# Patient Record
Sex: MALE | Race: BLACK OR AFRICAN AMERICAN
[De-identification: names, ages, dates, MRNs, and addresses within clinical notes are randomized per-mention and may not be internally consistent; named-entity substitution may affect disease eponyms.]

---

## 2017-01-16 ENCOUNTER — HOSPITAL ENCOUNTER (EMERGENCY)
Dept: HOSPITAL 72 - EMR | Age: 42
Discharge: HOME | End: 2017-01-16
Payer: COMMERCIAL

## 2017-01-16 VITALS — SYSTOLIC BLOOD PRESSURE: 158 MMHG | DIASTOLIC BLOOD PRESSURE: 88 MMHG

## 2017-01-16 VITALS — HEIGHT: 69 IN | BODY MASS INDEX: 41.47 KG/M2 | WEIGHT: 280 LBS

## 2017-01-16 VITALS — SYSTOLIC BLOOD PRESSURE: 153 MMHG | DIASTOLIC BLOOD PRESSURE: 98 MMHG

## 2017-01-16 DIAGNOSIS — R07.89: Primary | ICD-10-CM

## 2017-01-16 DIAGNOSIS — M25.511: ICD-10-CM

## 2017-01-16 LAB
ALBUMIN/GLOB SERPL: 1.3 {RATIO} (ref 1–2.7)
ALT SERPL-CCNC: 16 U/L (ref 3–41)
ANION GAP SERPL CALC-SCNC: 16 MMOL/L (ref 5–15)
APPEARANCE UR: CLEAR
APTT BLD: 29 SEC (ref 23–33)
AST SERPL-CCNC: 14 U/L (ref 5–40)
BASOPHILS NFR BLD AUTO: 1.4 % (ref 0–2)
CALCIUM SERPL-MCNC: 9.6 MG/DL (ref 8.6–10.2)
CHLORIDE SERPL-SCNC: 94 MEQ/L (ref 98–107)
CO2 SERPL-SCNC: 26 MEQ/L (ref 20–30)
CREAT SERPL-MCNC: 1 MG/DL (ref 0.7–1.2)
EOSINOPHIL NFR BLD AUTO: 1.6 % (ref 0–3)
ERYTHROCYTE [DISTWIDTH] IN BLOOD BY AUTOMATED COUNT: 10.7 % (ref 11.6–14.8)
GFR SERPLBLD BASED ON 1.73 SQ M-ARVRAT: > 60 ML/MIN (ref 60–?)
GLOBULIN SER-MCNC: 3.3 G/DL
HEMOLYSIS: 9
INR PPP: 1 (ref 0.9–1.1)
KETONES UR QL STRIP: (no result)
LEUKOCYTE ESTERASE UR QL STRIP: NEGATIVE
LYMPHOCYTES NFR BLD AUTO: 21 % (ref 20–45)
MCH RBC QN AUTO: 31 PG (ref 27–31)
MCHC RBC AUTO-ENTMCNC: 33.4 G/DL (ref 32–36)
MCV RBC AUTO: 93 FL (ref 80–99)
MONOCYTES NFR BLD AUTO: 7.2 % (ref 1–10)
NEUTROPHILS NFR BLD AUTO: 68.8 % (ref 45–75)
NITRITE UR QL STRIP: NEGATIVE
PH UR STRIP: 6 [PH] (ref 4.5–8)
PLATELET # BLD: 276 K/UL (ref 150–450)
PMV BLD AUTO: 8.5 FL (ref 6.5–10.1)
POTASSIUM SERPL-SCNC: 4 MEQ/L (ref 3.4–4.9)
PROT SERPL-MCNC: 7.6 G/DL (ref 6.6–8.7)
PROT UR QL STRIP: NEGATIVE
PROTHROMBIN TIME: 10 SEC (ref 9.3–11.5)
RBC # BLD AUTO: 5.7 M/UL (ref 4.7–6.1)
SODIUM SERPL-SCNC: 136 MEQ/L (ref 135–145)
SP GR UR STRIP: 1.02 (ref 1–1.03)
TROPONIN I SERPL-MCNC: < 0.3 NG/ML (ref ?–0.3)
UROBILINOGEN UR-MCNC: 1 MG/DL (ref 0–1)
WBC # BLD AUTO: 10.9 K/UL (ref 4.8–10.8)

## 2017-01-16 PROCEDURE — 85730 THROMBOPLASTIN TIME PARTIAL: CPT

## 2017-01-16 PROCEDURE — 85610 PROTHROMBIN TIME: CPT

## 2017-01-16 PROCEDURE — 71010: CPT

## 2017-01-16 PROCEDURE — 93005 ELECTROCARDIOGRAM TRACING: CPT

## 2017-01-16 PROCEDURE — 82550 ASSAY OF CK (CPK): CPT

## 2017-01-16 PROCEDURE — 84484 ASSAY OF TROPONIN QUANT: CPT

## 2017-01-16 PROCEDURE — 96374 THER/PROPH/DIAG INJ IV PUSH: CPT

## 2017-01-16 PROCEDURE — 83880 ASSAY OF NATRIURETIC PEPTIDE: CPT

## 2017-01-16 PROCEDURE — 85025 COMPLETE CBC W/AUTO DIFF WBC: CPT

## 2017-01-16 PROCEDURE — 81003 URINALYSIS AUTO W/O SCOPE: CPT

## 2017-01-16 PROCEDURE — 80300: CPT

## 2017-01-16 PROCEDURE — 99284 EMERGENCY DEPT VISIT MOD MDM: CPT

## 2017-01-16 PROCEDURE — 36415 COLL VENOUS BLD VENIPUNCTURE: CPT

## 2017-01-16 PROCEDURE — 73030 X-RAY EXAM OF SHOULDER: CPT

## 2017-01-16 PROCEDURE — 80053 COMPREHEN METABOLIC PANEL: CPT

## 2017-01-16 NOTE — EMERGENCY ROOM REPORT
History of Present Illness


General


Chief Complaint:  Chest Pain





Present Illness


[facility]


Chest pain started this AM.  Non-exertional, sharp, 6/10, not radiate.  No 

diaphoresis, NV.  Had before.  





Also has R shoulder pain. Worsened with movement.  8/10, aching/sharp.  Not 

radiate.  Tender to palpation.  No trauma.  





Had eval at hosp - told needed further studies (1 month ago).  Not follow up.





RF + pre-diabetic.





No fever, cough, dyspnea, rashes, HA, change bowels.


Allergies:  


Coded Allergies:  


     No Known Allergies (Unverified , 5/28/14)





Patient History


Past Medical History:  see triage record


Social History:  Denies: smoking


Social History Narrative


culinary student





Review of Systems


All Other Systems:  negative except mentioned in HPI





Physical Exam





Vital Signs








  Date Time  Temp Pulse Resp B/P Pulse Ox O2 Delivery O2 Flow Rate FiO2


 


1/16/17 00:20 99.0 82 18 172/101 96 Room Air  








Sp02 EP Interpretation:  reviewed, normal


General Appearance:  well appearing, no apparent distress, GCS 15


Head:  normocephalic


Eyes:  bilateral eye PERRL, bilateral eye normal inspection


ENT:  moist mucus membranes


Neck:  supple


Respiratory:  lungs clear, normal breath sounds


Cardiovascular #1:  regular rate, rhythm


Cardiovascular #2:  2+ radial (R)


Gastrointestinal:  normal inspection, normal bowel sounds, non tender, no mass, 

non-distended


Musculoskeletal:  back normal, gait/station normal, normal range of motion, 

other - tenderness to palpation R shoulder with some pain with active ROM and 

abduction


Neurologic:  alert, oriented x3


Psychiatric:  mood/affect normal


Skin:  normal inspection, warm/dry, other - no warmth over shoulder





Medical Decision Making


Diagnostic Impression:  


 Primary Impression:  


 Chest pain


 Qualified Codes:  R07.9 - Chest pain, unspecified


 Additional Impression:  


 Right shoulder pain


 Qualified Codes:  M25.511 - Pain in right shoulder


ER Course


Patient presents with atypical chest pain.  DDx: ACS, AMI, GERD, pleurisy 

amongst others.  Needs eval for ACS.  Also has R shoulder pain.  This needs 

eval with x-ray.  Will treat with analgesics.





Labs exclude myocardial damage.  EKG normal.  CXR normal.  





Improved with treatment.  Doubt cardiac etiology.





Patient stable for outpatient observation and treatment.





Labs








Test


  1/16/17


00:45


 


White Blood Count


  10.9 K/UL


(4.8-10.8)


 


Red Blood Count


  5.70 M/UL


(4.70-6.10)


 


Hemoglobin


  17.7 G/DL


(14.2-18.0)


 


Hematocrit


  53.0 %


(42.0-52.0)


 


Mean Corpuscular Volume 93 FL (80-99) 


 


Mean Corpuscular Hemoglobin


  31.0 PG


(27.0-31.0)


 


Mean Corpuscular Hemoglobin


Concent 33.4 G/DL


(32.0-36.0)


 


Red Cell Distribution Width


  10.7 %


(11.6-14.8)


 


Platelet Count


  276 K/UL


(150-450)


 


Mean Platelet Volume


  8.5 FL


(6.5-10.1)


 


Neutrophils (%) (Auto)


  68.8 %


(45.0-75.0)


 


Lymphocytes (%) (Auto)


  21.0 %


(20.0-45.0)


 


Monocytes (%) (Auto)


  7.2 %


(1.0-10.0)


 


Eosinophils (%) (Auto)


  1.6 %


(0.0-3.0)


 


Basophils (%) (Auto)


  1.4 %


(0.0-2.0)


 


Prothrombin Time


  10.0 SEC


(9.30-11.50)


 


Prothromb Time International


Ratio 1.0 (0.9-1.1) 


 


 


Activated Partial


Thromboplast Time 29 SEC (23-33) 


 


 


Urine Color Yellow 


 


Urine Appearance Clear 


 


Urine pH 6 (4.5-8.0) 


 


Urine Specific Gravity


  1.025


(1.005-1.035)


 


Urine Protein


  Negative


(NEGATIVE)


 


Urine Glucose (UA) 2+ (NEGATIVE) 


 


Urine Ketones 1+ (NEGATIVE) 


 


Urine Occult Blood


  Negative


(NEGATIVE)


 


Urine Nitrite


  Negative


(NEGATIVE)


 


Urine Bilirubin


  Negative


(NEGATIVE)


 


Urine Urobilinogen


  1 MG/DL


(0.0-1.0)


 


Urine Leukocyte Esterase


  Negative


(NEGATIVE)


 


Sodium Level


  136 mEQ/L


(135-145)


 


Potassium Level


  4.0 mEQ/L


(3.4-4.9)


 


Chloride Level


  94 mEQ/L


()


 


Carbon Dioxide Level


  26 mEQ/L


(20-30)


 


Anion Gap 16 (5-15) 


 


Blood Urea Nitrogen


  13 mg/dL


(7-23)


 


Creatinine


  1.0 mg/dL


(0.7-1.2)


 


Estimat Glomerular Filtration


Rate > 60 mL/min


(>60)


 


Glucose Level


  199 mg/dL


()


 


Calcium Level


  9.6 mg/dL


(8.6-10.2)


 


Total Bilirubin


  0.4 mg/dL


(0.0-1.2)


 


Aspartate Amino Transf


(AST/SGOT) 14 U/L (5-40) 


 


 


Alanine Aminotransferase


(ALT/SGPT) 16 U/L (3-41) 


 


 


Alkaline Phosphatase


  69 U/L


()


 


Total Creatine Kinase


  99 U/L


()


 


Troponin I


  < 0.30 ng/mL


(<=0.30)


 


Pro-B-Type Natriuretic Peptide


  47 pg/mL


(0-125)


 


Total Protein


  7.6 g/dL


(6.6-8.7)


 


Albumin


  4.3 g/dL


(3.5-5.2)


 


Globulin 3.3 g/dL 


 


Albumin/Globulin Ratio 1.3 (1.0-2.7) 


 


Urine Opiates Screen


  Negative


(NEGATIVE)


 


Urine Barbiturates Screen


  Negative


(NEGATIVE)


 


Phencyclidine (PCP) Screen


  Negative


(NEGATIVE)


 


Urine Amphetamines Screen


  Negative


(NEGATIVE)


 


Urine Benzodiazepines Screen


  Negative


(NEGATIVE)


 


Urine Cocaine Screen


  Negative


(NEGATIVE)


 


Urine Marijuana (THC) Screen


  Negative


(NEGATIVE)








EKG Diagnostic Results


Rate:  normal


Rhythm:  NSR


ST Segments:  no acute changes





Rhythm Strip Diag. Results


EP Interpretation:  yes


Rhythm:  NSR, no PVC's, no ectopy





Chest X-Ray Diagnostic Results


EP Interpretation:  Yes


Findings:  no consolidation, no effusion, no pneumothorax, no acute 

cardiopulmonary disease


Number of Views:  1





Other X-Ray Diagnostic Results


Other X-Ray Diagnostic Results :  


   X-Ray Ordered:  R shoulder


   EP Interpretation:  Yes


   Findings:  no fractures, no dislocation, no soft tissue swelling


   Number of Views:  3





Last Vital Signs








  Date Time  Temp Pulse Resp B/P Pulse Ox O2 Delivery O2 Flow Rate FiO2


 


1/16/17 03:10  77 18 158/88 94 Room Air  





  77      


 


1/16/17 03:04 98.0       





VS against PE.


Status:  improved


Disposition:  HOME, SELF-CARE


Condition:  Improved


Scripts


Ibuprofen* (MOTRIN*) 600 Mg Tablet


600 MG ORAL Q6H Y for For Pain, #20 TAB


   Prov: Yury Viramontes M.D.         1/16/17 


Tramadol Hcl* (ULTRAM*) 50 Mg Tablet


50 MG ORAL Q6H Y for For Pain, #14 TAB 0 Refills


   Prov: Yury Viramontes M.D.         1/16/17


Referrals:  


PREFERRED [facility],REFERRING (PCP)











Yury Viramontes M.D. Jan 16, 2017 02:24

## 2017-01-16 NOTE — DIAGNOSTIC IMAGING REPORT
Indications: Shoulder pain



Technique: 3 views of the right shoulder



Findings:



Comparison: None



No fracture, dislocation, lytic destruction, periosteal reaction, surrounding soft

tissue swelling, or other acute change is demonstrated. Small spur inferior margin

distal end right clavicle. No deformity, alignment abnormality, additional 

arthritic

change, soft tissue calcification, or other chronic change is demonstrated.



IMPRESSION:



Minimal inferior margin spurring distal end right clavicle. Rotator cuff impingement

not excludable.



Otherwise negative right shoulder series.

## 2017-01-16 NOTE — CARDIOLOGY REPORT
--------------- APPROVED REPORT --------------





EKG Measurement

Heart [id number]

[id number]

[id number]

[id number]

[id number]





Normal sinus rhythm

Normal ECG

## 2017-01-17 NOTE — DIAGNOSTIC IMAGING REPORT
Indication: Chest pain



Technique:  Single portable AP view of the chest.



Findings:



Comparison:  5/28/2014



The bones and extra pulmonary soft tissues, cardiomediastinal silhouette, 

pulmonary

vasculature and parenchyma, and pleural surfaces remain unremarkable.



IMPRESSION:



Negative portable AP chest, unchanged.

## 2017-06-25 NOTE — EMERGENCY ROOM REPORT
History of Present Illness


General


Chief Complaint:  Upper Respiratory Illness


Source:  Patient





Present Illness


HPI


42-year-old male presents emergency department complaining of  painful 

productive cough, fevers, chills, wheezing , nasal congestion and sore throat 

x3 days he denies taking  medications for his symptoms. he does not know if he 

had a flu vaccination this year patient denies past medical history other than 

being prediabetic. Pt denies CP at rest, reports pain only with coughing. pt. 

describes deep or normal inhalation exacerbates coughing, and short breaths do 

not illicit his cough. Denies ill contacts or recent travel. Denies  

Palpitations, LOC, AMS, dizziness, Changes in Vision, Sensation, paresthesias, 

or a sudden severe headache.


Allergies:  


Coded Allergies:  


     No Known Allergies (Unverified , 5/28/14)





Patient History


Past Medical History:  see triage record


Past Surgical History:  none


Pertinent Family History:  none


Reviewed Nursing Documentation:  PMH: Agreed, PSxH: Agreed





Nursing Documentation-PMH


Past Medical History:  No Stated History





Review of Systems


All Other Systems:  negative except mentioned in HPI





Physical Exam





Vital Signs








  Date Time  Temp Pulse Resp B/P Pulse Ox O2 Delivery O2 Flow Rate FiO2


 


6/25/17 14:47 98.4 104 18 147/97 96 Room Air  








Sp02 EP Interpretation:  reviewed, abnormal - mild tachycardia 104 bpm


General Appearance:  no apparent distress, alert, GCS 15, non-toxic


Head:  normocephalic, atraumatic


Eyes:  bilateral eye PERRL, bilateral eye normal inspection


ENT:  hearing grossly normal, normal pharynx, no angioedema, normal voice, TMs 

+ canals normal, uvula midline, moist mucus membranes, pharyngeal erythema, 

other - no tonsillar exudates


Neck:  full range of motion, supple/symm/no masses


Respiratory:  chest non-tender, normal breath sounds, speaking full sentences, 

wheezing


Cardiovascular #1:  regular rate, rhythm, no edema, normal capillary refill


Musculoskeletal:  back normal, gait/station normal, normal range of motion, non-

tender


Neurologic:  alert, oriented x3, responsive, motor strength/tone normal, 

sensory intact, speech normal


Psychiatric:  judgement/insight normal, memory normal, mood/affect normal


Skin:  normal color, no rash, warm/dry, well hydrated


Lymphatic:  no adenopathy





Medical Decision Making


PA Attestation


Dr. Link  is my supervising Physician whom patient management has been 

discussed with.


Diagnostic Impression:  


 Primary Impression:  


 Bronchitis


ER Course


42-year-old male presents emergency department complaining of productive cough, 

fevers, chills, wheezing and difficulty breathing x3 days he denies taking any 

medications for his symptoms he does not know if he had a flu vaccination this 

year patient denies past medical history other than being prediabetic. Denies 

ill contacts or recent travel. Denies CP, Palpitations, LOC, AMS, dizziness, 

Changes in Vision, Sensation, paresthesias, or a sudden severe headache.





Ddx considered but are not limited to URI, pneumonia, PE, strep pharyngitis, 

meningitis, bronchitis





Vital signs: Pt. is afebrile, the remaining VS are WNL: initial triage HR was 

mild tachycardic at 104bpm, however subsequent measurements were of normal rate.


H&PE are most consistent with URI- Bronchitis with scant wheezes auscultated 

bilaterally, no meningeal signs, oropharynx is not involved, no evidence of 

bacterial infection at this time. due to presence of multiples symptoms and no 

CP at rest,  cardiac cause is unlikely, i do not suspect PE at this time - not 

consistent with HPI and pt. has other more likely diagnosis. 





ORDERS: 


-CXR:No consolidation, effusion, pneumothorax or acute cardiopulmonary findings 

per soft read in ED by Dr. Link 





ED INTERVENTIONS: 


-Albuterol Nebulized Treatment. - lung sounds have improved bilaterally after 

nebulized treatment.





d/w pt that he will be treated conservatively for URI/bronchitis, and that I do 

not feel antibiotics would be of benefit at this time. d/w pt. to return to the 

ED promptly if he feels his symptoms are becoming worse, or new symptoms 

develop. - Pt verbalized his understanding, and agreement with this treatment 

plan.  








DISCHARGE: At this time pt. is stable for d/c to home. Will provide printed 

patient care instructions, and any necessary prescriptions. Care plan and 

follow up instructions have been discussed with the patient prior to discharge.





Last Vital Signs








  Date Time  Temp Pulse Resp B/P Pulse Ox O2 Delivery O2 Flow Rate FiO2


 


6/25/17 14:47  104 18   Room Air  


 


6/25/17 14:47 98.4   147/97 96   








Disposition:  HOME, SELF-CARE


Condition:  Stable


Scripts


Acetaminophen* (TYLENOL EXTRA STRENGTH*) 500 Mg Tablet


500 MG ORAL Q6H, #20 TAB 0 Refills


   Prov: Amador,Yessica P.A.         6/25/17 


Albuterol Sulfate* (ALBUTEROL SULFATE MDI*) 8.5 Gm Hfa.aer.ad


2 PUFF INH Q3H, #1 INH 0 Refills


   Prov: Yessica Amador         6/25/17 


Codeine/Promethazine Hcl* (PROMETHAZINE-CODEINE SYRUP*) 118 Ml Syrup


5 ML ORAL Q6H Y for For Cough, #118 ML 0 Refills


   Prov: Yessica Amador         6/25/17


Patient Instructions:  Upper Respiratory Infection, Adult





Additional Instructions:  


Take medications as directed. 


Follow up with PCP in 3-5 days 


Return sooner to ED if new symptoms occur, or current symptoms become worse. 








- Please note that this Emergency Department Report was dictated using [employer] technology software, occasionally this can lead to 

erroneous entry secondary to interpretation by the dictation equipment.











Yessica Amador Jun 25, 2017 15:23

## 2017-06-26 NOTE — DIAGNOSTIC IMAGING REPORT
Indication: COUGH



Technique: One view of the chest



Comparison: 1/16/2017



Findings: Lungs and pleural spaces are clear. Heart size is normal. No 

significant

change



Impression: No acute process

## 2017-11-29 NOTE — EMERGENCY ROOM REPORT
History of Present Illness


General


Chief Complaint:  Chest Pain


Source:  Patient





Present Illness


HPI


42-year-old male no significant past medical history p/w chest pain for 2 days. 

Chest pain started while at rest. Localized to substernal area, no radiation to 

back or other areas, sharp in nature, gradual in onset, last about 10-15 minutes

, multiple episodes. Occurred on rest and on exertion.  Denies SOB. Denies 

palpitations, diaphoresis, n/v. This is the first occurrence of chest pain. 


Denies fever, chills, cough, abd pain. Denies trauma.


Patient states that he has had a stress test in the past but does not know the 

results


Patient has never had a cardiac catheterization. 


Denies smoking, no family history of cardiac disease at a young age.


Allergies:  


Coded Allergies:  


     No Known Allergies (Unverified , 5/28/14)





Patient History


Past Medical History:  see triage record


Past Surgical History:  none


Pertinent Family History:  none


Reviewed Nursing Documentation:  PMH: Agreed, PSxH: Agreed





Nursing Documentation-PMH


Past Medical History:  No Stated History





Review of Systems


All Other Systems:  negative except mentioned in HPI





Physical Exam





Vital Signs








  Date Time  Temp Pulse Resp B/P (MAP) Pulse Ox O2 Delivery O2 Flow Rate FiO2


 


11/29/17 19:52 97.9 84 20 159/114 98 Room Air  








Sp02 EP Interpretation:  reviewed, normal


General Appearance:  normal inspection, well appearing, no apparent distress, 

alert, GCS 15, non-toxic, other - Does not appear to be in pain


Head:  normocephalic, atraumatic


Eyes:  bilateral eye normal inspection, bilateral eye PERRL, bilateral eye EOMI


ENT:  normal ENT inspection, normal pharynx, normal voice, moist mucus membranes


Neck:  normal inspection, full range of motion, supple


Respiratory:  normal inspection, lungs clear, normal breath sounds, no 

respiratory distress, no retraction, no wheezing, speaking full sentences, 

chest symmetrical


Cardiovascular #1:  normal inspection, regular rate, rhythm, normal capillary 

refill


Cardiovascular #2:  2+ radial (R), 2+ radial (L)


Gastrointestinal:  normal inspection, non tender, soft, non-distended, no 

guarding


Musculoskeletal:  normal inspection, back normal, normal range of motion, non-

tender


Neurologic:  normal inspection, alert, oriented x3, responsive, motor strength/

tone normal, sensory intact, normal gait, speech normal


Psychiatric:  normal inspection, judgement/insight normal, memory normal


Skin:  normal inspection, normal color, no rash, warm/dry, well hydrated, 

normal turgor





Medical Decision Making


Diagnostic Impression:  


 Primary Impression:  


 Chest pain


ER Course


42-year-old male, chest pain for 2 days





DDX: 


Musculoskeletal versus ACS vs. CHF vs. pneumonia vs. gastritis/GERD vs. 

pneumothorax 


PE on differential however at this time there are other more likely diagnoses. 


The patient appears nontoxic at this time, does not appear to be in pain, not 

hypertensive, no radiation to back, unlikely to have aortic dissection





Plan:


IV access, obtain labs including troponin, EKG, CXR


ASA





ER course:


Labs: troponin negative


Patient given ASA. 


Patient remained chest pain free during ED stay.


Sleeping comfortably








Disposition:


Patient will be discharged to home.


Strict precautions discussed with patient on when to emergently return to the ED

: this includes worsening/severe chest pain, palpitations, shortness of breath, 

syncopal episodes, fever or chills, which may indicate severe illness. Patient 

verbalized understanding.


Patient instructed to follow up with their PMD within the next 2 days. 


Patient also instructed to follow up with a cardiologist within 2 days for 

possible outpatient stress test. Patient agrees with plan.





Please note that this Emergency Department Report was dictated using [employer] technology software, occasionally this can lead to 

erroneous entry secondary to interpretation by the dictation equipment.





EKG Diagnostic Results


EP Interpretation: Yes


Rate: normal


Rhythm: NSR


ST Segments: Q waves III


ASA given to patient: no





Rhythm Strip


EP Interpretation: Yes


Rate: 70


Rhythm: NSR, no PVCs, no ectopy





Chest X-ray


CXR: Ordered: Yes


1 view


Indication: Chest pain


EP interpretation: Yes


Interpretation: No consolidation, no effusion, no PTX, no acute cardiopulmonary 

disease


Impression: No acute disease





Electronically signed by Eliazar Blanco MD





Laboratory Tests








Test


  11/29/17


20:26


 


White Blood Count


  5.9 K/UL


(4.8-10.8)


 


Red Blood Count


  5.35 M/UL


(4.70-6.10)


 


Hemoglobin


  16.9 G/DL


(14.2-18.0)


 


Hematocrit


  50.6 %


(42.0-52.0)


 


Mean Corpuscular Volume 95 FL (80-99)  


 


Mean Corpuscular Hemoglobin


  31.7 PG


(27.0-31.0)  H


 


Mean Corpuscular Hemoglobin


Concent 33.5 G/DL


(32.0-36.0)


 


Red Cell Distribution Width


  10.6 %


(11.6-14.8)  L


 


Platelet Count


  210 K/UL


(150-450)


 


Mean Platelet Volume


  8.9 FL


(6.5-10.1)


 


Neutrophils (%) (Auto)


  50.4 %


(45.0-75.0)


 


Lymphocytes (%) (Auto)


  35.5 %


(20.0-45.0)


 


Monocytes (%) (Auto)


  8.8 %


(1.0-10.0)


 


Eosinophils (%) (Auto)


  3.4 %


(0.0-3.0)  H


 


Basophils (%) (Auto)


  1.9 %


(0.0-2.0)


 


Urine Color Pale yellow  


 


Urine Appearance Clear  


 


Urine pH 5 (4.5-8.0)  


 


Urine Specific Gravity


  1.020


(1.005-1.035)


 


Urine Protein


  Negative


(NEGATIVE)


 


Urine Glucose (UA)


  4+ (NEGATIVE)


H


 


Urine Ketones


  1+ (NEGATIVE)


H


 


Urine Occult Blood


  1+ (NEGATIVE)


H


 


Urine Nitrite


  Negative


(NEGATIVE)


 


Urine Bilirubin


  Negative


(NEGATIVE)


 


Urine Urobilinogen


  Normal MG/DL


(0.0-1.0)


 


Urine Leukocyte Esterase


  Negative


(NEGATIVE)


 


Urine RBC


  0-2 /HPF (0 -


0)  H


 


Urine WBC


  0-2 /HPF (0 -


0)


 


Urine Squamous Epithelial


Cells None /LPF


(NONE/OCC)


 


Urine Bacteria


  Few /HPF


(NONE)


 


Sodium Level


  135 MMOL/L


(136-145)  L


 


Potassium Level


  3.8 MMOL/L


(3.5-5.1)


 


Chloride Level


  98 MMOL/L


()


 


Carbon Dioxide Level


  28 MMOL/L


(21-32)


 


Anion Gap


  9 mmol/L


(5-15)


 


Blood Urea Nitrogen


  15 mg/dL


(7-18)


 


Creatinine


  1.2 MG/DL


(0.55-1.30)


 


Estimate Glomerular


Filtration Rate > 60 mL/min


(>60)


 


Glucose Level


  349 MG/DL


()  H


 


Calcium Level


  9.0 MG/DL


(8.5-10.1)


 


Total Bilirubin


  0.5 MG/DL


(0.2-1.0)


 


Aspartate Amino Transferase


(AST) 12 U/L (15-37)


L


 


Alanine Aminotransferase (ALT)


  27 U/L (12-78)


 


 


Alkaline Phosphatase


  77 U/L


()


 


Troponin I


  0.000 ng/mL


(0.000-0.056)


 


Pro-B-Type Natriuretic Peptide


  10 pg/mL


(0-125)


 


Total Protein


  7.4 G/DL


(6.4-8.2)


 


Albumin


  3.7 G/DL


(3.4-5.0)


 


Globulin 3.7 g/dL  


 


Albumin/Globulin Ratio 1.0 (1.0-2.7)  











Last Vital Signs








  Date Time  Temp Pulse Resp B/P (MAP) Pulse Ox O2 Delivery O2 Flow Rate FiO2


 


11/29/17 20:33 97.9  20 159/114 98 Room Air  


 


11/29/17 20:33  84      

















Eliazar Blanco M.D. Nov 29, 2017 20:38

## 2017-11-30 NOTE — DIAGNOSTIC IMAGING REPORT
Indication: Chest pain



Technique: One view of the chest



Comparison: 6/25/2017



Findings: Lungs and pleural spaces are clear. Heart size is normal. No 

significant

change



Impression: No acute process

## 2017-12-02 NOTE — CARDIOLOGY REPORT
--------------- APPROVED REPORT --------------





EKG Measurement

Heart [id number]

[id number]

[id number]

[id number]

[id number]





Normal sinus rhythm

Possible Inferior infarct, age undetermined

Abnormal ECG

## 2018-03-16 NOTE — EMERGENCY ROOM REPORT
History of Present Illness


General


Chief Complaint:  Flu Like Symptoms


Source:  Patient





Present Illness


HPI


43-year-old male presents with 2-3 days of dry cough, fever and shaking chills 

at home.


Associated with URI symptoms including cough, rhinorrhea, sore throat


Denies history of asthma however states that he frequently gets acute bronchitis


Nonsmoker.


denies any sick contacts


.


Allergies:  


Coded Allergies:  


     No Known Allergies (Unverified , 5/28/14)





Patient History


Past Medical History:  none


Past Surgical History:  none


Pertinent Family History:  none


Social History:  Denies: smoking, alcohol use, drug use


Immunizations:  UTD


Reviewed Nursing Documentation:  PMH: Agreed, PSxH: Agreed





Nursing Documentation-PMH


Past Medical History:  No History, Except For





Review of Systems


All Other Systems:  negative except mentioned in HPI





Physical Exam





Vital Signs








  Date Time  Temp Pulse Resp B/P (MAP) Pulse Ox O2 Delivery O2 Flow Rate FiO2


 


3/16/18 10:49 98.7 93 16 157/100 87 Room Air  





 98.8       


 


3/16/18 11:37        21








Sp02 EP Interpretation:  reviewed, normal


General Appearance:  normal inspection, well appearing, no apparent distress, 

alert, GCS 15, non-toxic


Head:  normocephalic, atraumatic


Eyes:  bilateral eye PERRL, bilateral eye EOMI


ENT:  normal ENT inspection, hearing grossly normal, normal pharynx, no 

angioedema, normal voice, TMs + canals normal, uvula midline, moist mucus 

membranes


Neck:  normal inspection, full range of motion, supple, thyroid normal, no 

meningismus, no bony tend


Respiratory:  normal inspection, no respiratory distress, no retraction, no 

accessory muscle use, speaking full sentences, wheezing


Cardiovascular #1:  regular rate, rhythm, no edema, no JVD, normal capillary 

refill


Gastrointestinal:  normal inspection, normal bowel sounds, non tender, soft, no 

mass, no peritonitis, non-distended, no guarding, no hernia, no pulsatile mass


Genitourinary:  no CVA tenderness


Musculoskeletal:  normal inspection, back normal, normal range of motion, no 

calf tenderness, pelvis stable, Joshua's Sign negative


Neurologic:  normal inspection, alert, oriented x3, responsive, CNs III-XII nml 

as tested, motor strength/tone normal, cerebellar normal, normal gait, speech 

normal


Psychiatric:  normal inspection, judgement/insight normal, mood/affect normal, 

no suicidal/homicidal ideation, no delusions


Skin:  normal inspection, normal color, no rash


Lymphatic:  normal inspection, no adenopathy





Medical Decision Making


Diagnostic Impression:  


 Primary Impression:  


 Cough


 Additional Impression:  


 Acute bronchitis


 Qualified Codes:  J20.9 - Acute bronchitis, unspecified


ER Course


43-year-old male With 2-3 days of dry cough and subjective fevers and chills


Chest x-ray negative for pneumonia


Feels better after steroids and albuterol inhaler treatment


likely acute bronchitis from URI, viral


However I am concerned for CAP given subjective fevers and chills at home


Will treat also with antibiotics








ER course:


Patient has remained stable during ED stay.





Disposition:





Patient is to be discharged to home.


Prescriptions given are ventolin, z-pack, prednisone


Patient is instructed to follow up with their primary care doctor within 5 

days. 


Strict return precautions discussed with patient such as fever, chills, 

worsening/severe pain, nausea, vomiting, which may indicate severe illness. 

Patient verbalizes understanding and agrees with plan. 





Please note that this Emergency Department Report was dictated using [employer] technology software, occasionally this can lead to 

erroneous entry secondary to interpretation by the dictation equipment


Chest X-Ray Diagnostic Results


Chest X-Ray Diagnostic Results :  


   Chest X-Ray Ordered:  Yes


   # of Views/Limited/Complete:  1 View


   Indication:  Shortness of Breath


   EP Interpretation:  Yes


   Interpretation:  no consolidation, no effusion, no pneumothorax, no acute 

cardiopulmonary disease


   Impression:  No acute disease


   Electronically Signed by:  Dr Gianna Vaughn MD





Last Vital Signs








  Date Time  Temp Pulse Resp B/P (MAP) Pulse Ox O2 Delivery O2 Flow Rate FiO2


 


3/16/18 11:37        21


 


3/16/18 11:37  81 18  100 Room Air  


 


3/16/18 10:59 98.8   157/100    





 98.8       








Status:  improved


Disposition:  HOME, SELF-CARE


Referrals:  


[facility],REFERRING (PCP)











GIANNA VAUGHN M.D. Mar 16, 2018 11:56

## 2018-03-16 NOTE — DIAGNOSTIC IMAGING REPORT
Indication: Cough

 

Technique: One view of the chest

 

Comparison: 11/29/2017

 

Findings: Lungs and pleural spaces are clear. Heart size is normal  . No significant

interim change

 

Impression: No acute process

## 2018-05-10 NOTE — CARDIOLOGY REPORT
--------------- APPROVED REPORT --------------





EKG Measurement

Heart [id number]

[id number]

[id number]

[id number]

[id number]





Normal sinus rhythm

Normal ECG

## 2018-05-10 NOTE — DIAGNOSTIC IMAGING REPORT
Indication: Chest pain

 

Comparison:  3/16/2018

 

A single view chest radiograph was obtained.

 

Findings:

 

Cardiomediastinal appearance is within normal limits for age.  Pulmonary vascularity

is appropriate. The diaphragmatic contour is smooth and costophrenic angles are

sharp. No pleural effusions are identified. The bones are unremarkable.

 

Impression: No acute findings

## 2018-05-10 NOTE — EMERGENCY ROOM REPORT
History of Present Illness


General


Chief Complaint:  Chest Pain


Source:  Patient





Present Illness


HPI


Patient presents with left-sided chest pain.  It started just before paramedics 

were called.  He was sitting down and going to lay down.  It's left-sided 

radiates towards his back.  He's been evaluated for chest pain like this 

before.  Paramedics gave the patient aspirin and nitroglycerin.  His blood 

pressure dropped to normal.  They felt that there might of been a component of 

anxiety.  His EKG showed no acute injury.  The pain radiates to his L shoulder.

  Pain rated 5/10 now.





Patient denies DM, HTN, family history CAD, elevated cholesterol.





No fevers, chills, cough, sore throat, rashes, headache, NVD, epigastric pain, 

indigestion, dysuria.  There is some stress at this time.


Allergies:  


Coded Allergies:  


     No Known Allergies (Unverified , 5/28/14)





Patient History


Past Medical History:  see triage record


Social History:  Denies: smoking


Social History Narrative


completed culinary school and unemployed


Reviewed Nursing Documentation:  PMH: Agreed; PSxH: Agreed





Nursing Documentation-PMH


Past Medical History:  No Stated History





Review of Systems


All Other Systems:  negative except mentioned in HPI





Physical Exam





Vital Signs








  Date Time  Temp Pulse Resp B/P (MAP) Pulse Ox O2 Delivery O2 Flow Rate FiO2


 


5/10/18 02:42 98.5 88 18 118/90 98 Room Air  





 98.4       








Sp02 EP Interpretation:  reviewed, normal


General Appearance:  well appearing, no apparent distress, GCS 15


Head:  normocephalic


Eyes:  bilateral eye normal inspection, bilateral eye PERRL


ENT:  moist mucus membranes


Neck:  supple


Respiratory:  lungs clear, normal breath sounds, other - some CWT


Cardiovascular #1:  regular rate, rhythm


Cardiovascular #2:  2+ radial (R)


Gastrointestinal:  normal inspection, normal bowel sounds, non tender, no mass, 

non-distended, overweight


Musculoskeletal:  back normal, gait/station normal, normal range of motion


Neurologic:  alert, oriented x3, grossly normal


Psychiatric:  mood/affect normal


Skin:  normal inspection, warm/dry





Medical Decision Making


Diagnostic Impression:  


 Primary Impression:  


 Chest pain


 Qualified Codes:  R07.9 - Chest pain, unspecified


 Additional Impression:  


 Hyperglycemia


ER Course


Patient presents with chest pain.  The history is somewhat atypical.  He really 

has no risk factors.  He was treated with aspirin and nitrates in the field.  

Differential includes acute myocardial infarction, acute coronary syndrome, 

anxiety, chest wall pain, GERD, gastritis amongst others.  He will be evaluated 

with EKG, chest x-ray and labs.  Treated with Pepcid as he already received 

treatment in the field.





EKG no injury.  CXR normal.  Labs significant for elevated glucose and negative 

troponin.





Metformin given (new diagnosis).  Discussed diabetes dx with patient and 

treatment plan.





Improved with observation.  He reported pain improved to me.





Patient stable for outpatient observation and treatment.





Laboratory Tests








Test


  5/10/18


03:05


 


White Blood Count


  6.6 K/UL


(4.8-10.8)


 


Red Blood Count


  5.34 M/UL


(4.70-6.10)


 


Hemoglobin


  18.0 G/DL


(14.2-18.0)


 


Hematocrit


  48.5 %


(42.0-52.0)


 


Mean Corpuscular Volume 91 FL (80-99)  


 


Mean Corpuscular Hemoglobin


  33.7 PG


(27.0-31.0)  H


 


Mean Corpuscular Hemoglobin


Concent 37.1 G/DL


(32.0-36.0)  H


 


Red Cell Distribution Width


  10.1 %


(11.6-14.8)  L


 


Platelet Count


  211 K/UL


(150-450)


 


Mean Platelet Volume


  8.7 FL


(6.5-10.1)


 


Neutrophils (%) (Auto)


  52.1 %


(45.0-75.0)


 


Lymphocytes (%) (Auto)


  37.0 %


(20.0-45.0)


 


Monocytes (%) (Auto)


  6.5 %


(1.0-10.0)


 


Eosinophils (%) (Auto)


  2.6 %


(0.0-3.0)


 


Basophils (%) (Auto)


  1.8 %


(0.0-2.0)


 


Sodium Level


  131 MMOL/L


(136-145)  L


 


Potassium Level


  3.8 MMOL/L


(3.5-5.1)


 


Chloride Level


  98 MMOL/L


()


 


Carbon Dioxide Level


  28 MMOL/L


(21-32)


 


Anion Gap


  5 mmol/L


(5-15)


 


Blood Urea Nitrogen


  16 mg/dL


(7-18)


 


Creatinine


  1.1 MG/DL


(0.55-1.30)


 


Estimate Glomerular


Filtration Rate > 60 mL/min


(>60)


 


Glucose Level


  312 MG/DL


()  H


 


Calcium Level


  7.9 MG/DL


(8.5-10.1)  L


 


Total Bilirubin


  0.8 MG/DL


(0.2-1.0)


 


Aspartate Amino Transferase


(AST) 11 U/L (15-37)


L


 


Alanine Aminotransferase (ALT)


  < 6 U/L


(12-78)  L


 


Alkaline Phosphatase


  85 U/L


()


 


Total Creatine Kinase


  102 U/L


()


 


Troponin I


  0.000 ng/mL


(0.000-0.056)


 


Total Protein


  7.8 G/DL


(6.4-8.2)


 


Albumin


  3.5 G/DL


(3.4-5.0)


 


Globulin 4.3 g/dL  


 


Urine Opiates Screen


  Negative


(NEGATIVE)


 


Urine Barbiturates Screen


  Negative


(NEGATIVE)


 


Phencyclidine (PCP) Screen


  Negative


(NEGATIVE)


 


Urine Amphetamines Screen


  Negative


(NEGATIVE)


 


Urine Benzodiazepines Screen


  Negative


(NEGATIVE)


 


Urine Cocaine Screen


  Negative


(NEGATIVE)


 


Urine Marijuana (THC) Screen


  Negative


(NEGATIVE)








EKG Diagnostic Results


Rate:  normal


Rhythm:  NSR


ST Segments:  no acute changes





Rhythm Strip Diag. Results


EP Interpretation:  yes


Rhythm:  NSR, no PVC's, no ectopy





Chest X-Ray Diagnostic Results


Chest X-Ray Diagnostic Results :  


   Chest X-Ray Ordered:  Yes


   # of Views/Limited/Complete:  1 View


   Indication:  Chest Pain


   EP Interpretation:  Yes


   Interpretation:  no consolidation, no effusion, no pneumothorax


   Impression:  No acute disease


   Electronically Signed by:  Electronically signed by Yury Viramontes MD





Last Vital Signs








  Date Time  Temp Pulse Resp B/P (MAP) Pulse Ox O2 Delivery O2 Flow Rate FiO2


 


5/10/18 06:00  66 18 148/83 98 Room Air  


 


5/10/18 06:00 98.4       





 98.4       








Status:  improved


Disposition:  HOME, SELF-CARE


Condition:  Improved


Scripts


Blood-Glucose Meter, Drum-Type (ACCU-CHEK) 1 Each Kit


EACH  DAILY, #1


   Prov: Yury Viramontes M.D.         5/10/18 


Lancing Device/Lancets (ACCU-CHEK SOFTCLIX LANCET KIT) 1 Each Kit


EACH , #30


   Prov: Yury Viramontes M.D.         5/10/18 


Ibuprofen* (MOTRIN*) 600 Mg Tablet


600 MG ORAL Q6H PRN for For Pain, #20 TAB


   Prov: Yury Viramontes M.D.         5/10/18 


Metformin Hcl* (METFORMIN HCL*) 500 Mg Tablet


500 MG ORAL TWICE A DAY, #60 TAB


   Prov: Yury Viramontes M.D.         5/10/18











Yury Viramontes M.D. May 10, 2018 03:01